# Patient Record
Sex: MALE | Race: WHITE | NOT HISPANIC OR LATINO | URBAN - METROPOLITAN AREA
[De-identification: names, ages, dates, MRNs, and addresses within clinical notes are randomized per-mention and may not be internally consistent; named-entity substitution may affect disease eponyms.]

---

## 2021-08-10 ENCOUNTER — EMERGENCY (EMERGENCY)
Facility: HOSPITAL | Age: 26
LOS: 1 days | Discharge: HOME | End: 2021-08-10
Attending: EMERGENCY MEDICINE
Payer: SELF-PAY

## 2021-08-10 VITALS
HEART RATE: 89 BPM | TEMPERATURE: 98 F | OXYGEN SATURATION: 100 % | SYSTOLIC BLOOD PRESSURE: 139 MMHG | HEIGHT: 71 IN | DIASTOLIC BLOOD PRESSURE: 77 MMHG | RESPIRATION RATE: 18 BRPM | WEIGHT: 184.97 LBS

## 2021-08-10 PROCEDURE — 73130 X-RAY EXAM OF HAND: CPT | Mod: 26,RT

## 2021-08-10 PROCEDURE — 73110 X-RAY EXAM OF WRIST: CPT | Mod: 26,RT

## 2021-08-10 PROCEDURE — 99284 EMERGENCY DEPT VISIT MOD MDM: CPT | Mod: 25

## 2021-08-10 PROCEDURE — 73110 X-RAY EXAM OF WRIST: CPT

## 2021-08-10 PROCEDURE — 73130 X-RAY EXAM OF HAND: CPT

## 2021-08-10 PROCEDURE — 99283 EMERGENCY DEPT VISIT LOW MDM: CPT

## 2021-08-10 RX ORDER — ACETAMINOPHEN 500 MG
975 TABLET ORAL ONCE
Refills: 0 | Status: COMPLETED | OUTPATIENT
Start: 2021-08-10 | End: 2021-08-10

## 2021-08-10 RX ADMIN — Medication 975 MILLIGRAM(S): at 20:19

## 2021-08-10 NOTE — ED PROVIDER NOTE - CARE PROVIDERS DIRECT ADDRESSES
[email or web address],DirectAddress_Unknown

## 2021-08-10 NOTE — ED PROVIDER NOTE - CARE PROVIDER_API CALL
Merry Giordano)  Orthopedics  [address] [facility]  [place] [address]  Phone: [phone]  Fax: [phone]  Follow Up Time: 1-3 Days    Constantino Cobian)  Plastic Surgery  [address] [place] [address]  Phone: [phone]  Fax: [phone]  Follow Up Time: 1-3 Days

## 2021-08-10 NOTE — ED PROVIDER NOTE - OBJECTIVE STATEMENT
27 y/o male with no PMHx presents to ED c/o wrist pain/injury. Patient reports diving during a baseball game when his right hand twisted. Patient reports tingling on the top of his hand. Patient took 2 ibuprofen prior to arrival. 27 y/o male with no PMHx presents to ED c/o hand pain/injury. Patient reports diving during a baseball game catching himself with his R hand. Patient reports tingling on the top of his hand. Patient took 2 ibuprofen prior to arrival. Denies additional injuries.

## 2021-08-10 NOTE — ED PROVIDER NOTE - CLINICAL SUMMARY MEDICAL DECISION MAKING FREE TEXT BOX
Patient with hand pain s/p injury while playing baseball. Will obtain X-ray, pain control and reassess.

## 2021-08-10 NOTE — ED PROVIDER NOTE - PHYSICAL EXAMINATION
Gen: Well appearing in NAD  Head: NC/AT  Neck: trachea midline  Resp:  No distress  Cardiac: normal pulse  Ext: no deformities. Normal sensation. 3rd metacarpal TTP   Neuro:  A&O appears non focal  Skin:  Warm and dry as visualized  Psych:  Normal affect and mood Gen: Well appearing in NAD  Head: NC/AT  Neck: trachea midline  Resp:  No distress  Cardiac: normal pulse  Ext: no deformities. Normal sensation. Right mid 3rd metacarpal TTP, no snuffbox ttp, FROM of R hand. Normal radial pulse. No edema, no ecchymosis.   Neuro:  A&O appears non focal  Skin:  Warm and dry as visualized  Psych:  Normal affect and mood

## 2021-08-10 NOTE — ED PROVIDER NOTE - NSFOLLOWUPINSTRUCTIONS_ED_ALL_ED_FT
- Follow up with your doctor within 2-3 days.   - Follow up with Orthopedics, see information above.   - Take Tylenol (Acetaminophen) 650mg or Motrin (Ibuprofen/Advil) 600mg every 6 hours as needed for pain.   - Keep hand elevated. Ice as tolerated.   - Return to the ED for any new or worsening symptoms.

## 2021-08-10 NOTE — ED ADULT TRIAGE NOTE - CHIEF COMPLAINT QUOTE
landed on right hand while playing baseball, c/o pain, tingling right after accident none at this time.

## 2021-08-10 NOTE — ED PROVIDER NOTE - PROVIDER TOKENS
PROVIDER:[TOKEN:[[id number]],FOLLOWUP:[1-3 Days]],PROVIDER:[TOKEN:[[id number]],FOLLOWUP:[1-3 Days]]

## 2021-08-10 NOTE — ED PROVIDER NOTE - PATIENT PORTAL LINK FT
You can access the FollowMyHealth Patient Portal offered by [facility] by registering at the following website: [email or web address]. By joining [facility]’s FollowMyHealth portal, you will also be able to view your health information using other applications (apps) compatible with our system.